# Patient Record
Sex: FEMALE | Race: BLACK OR AFRICAN AMERICAN
[De-identification: names, ages, dates, MRNs, and addresses within clinical notes are randomized per-mention and may not be internally consistent; named-entity substitution may affect disease eponyms.]

---

## 2019-12-23 ENCOUNTER — HOSPITAL ENCOUNTER (EMERGENCY)
Dept: HOSPITAL 72 - EMR | Age: 69
Discharge: HOME | End: 2019-12-23
Payer: MEDICARE

## 2019-12-23 VITALS — BODY MASS INDEX: 29.02 KG/M2 | WEIGHT: 170 LBS | HEIGHT: 64 IN

## 2019-12-23 VITALS — SYSTOLIC BLOOD PRESSURE: 139 MMHG | DIASTOLIC BLOOD PRESSURE: 84 MMHG

## 2019-12-23 DIAGNOSIS — F31.9: ICD-10-CM

## 2019-12-23 DIAGNOSIS — E78.5: ICD-10-CM

## 2019-12-23 DIAGNOSIS — I10: ICD-10-CM

## 2019-12-23 DIAGNOSIS — E11.65: Primary | ICD-10-CM

## 2019-12-23 PROCEDURE — 82962 GLUCOSE BLOOD TEST: CPT

## 2019-12-23 PROCEDURE — 99283 EMERGENCY DEPT VISIT LOW MDM: CPT

## 2019-12-23 PROCEDURE — 96372 THER/PROPH/DIAG INJ SC/IM: CPT

## 2019-12-23 NOTE — NUR
ER DISCHARGE NOTE: 10 units Reg insulin administered and patient teaching given 
regarding hyperglycemia. Patient is cleared to be discharged per ERMD, pt is 
aox4, on room air, with stable vital signs. pt was given dc and prescription 
instructions, pt was able to verbalize understanding, pt id band removed. pt is 
able to ambulate with steady gait. pt took all belongings.

## 2019-12-23 NOTE — NUR
ED Nurse Note:

pt arrives with  who states that pt has not been compliant with her 
medications for dm.  pt is alert and oriented at this time.  md parr of pt.

## 2019-12-23 NOTE — EMERGENCY ROOM REPORT
History of Present Illness


General


Chief Complaint:  General Complaint


Source:  Patient, Significant Other





Present Illness


HPI


69-year-old female presents ED for evaluation.  Brought in by  states 

that her Accu-Chek was high.  Over 300 at home.  History of diabetes.  States 

that she sometimes forgets to take her medications.  States she feels fine.  

Denies any dizziness or blurry vision.  Denies nausea or vomiting.  Also states 

she has been eating a lot of sweets lately because of the holidays.  No other 

aggravating relieving factors.  Denies any other associated symptoms


Allergies:  


Coded Allergies:  


     No Known Allergies (Unverified , 12/7/12)





Patient History


Past Medical History:  DM, HTN, psych hx


Pregnant Now:  No





Nursing Documentation-PMH


Hx Cardiac Problems:  Yes - Hyperlipidemia


Hx Hypertension:  Yes


Hx Asthma:  Yes


Hx Diabetes:  Yes


Hx Neurological Problems:  Yes - Bipolar





Review of Systems


All Other Systems:  negative except mentioned in HPI





Physical Exam





Vital Signs








  Date Time  Temp Pulse Resp B/P (MAP) Pulse Ox O2 Delivery O2 Flow Rate FiO2


 


12/23/19 12:42 98.1 111 20 139/84 (102) 96 Room Air  








Sp02 EP Interpretation:  reviewed, normal


General Appearance:  no apparent distress, alert, GCS 15, non-toxic


Head:  normocephalic, atraumatic


Eyes:  bilateral eye normal inspection, bilateral eye PERRL


ENT:  hearing grossly normal, normal pharynx, no angioedema, normal voice


Neck:  full range of motion, supple/symm/no masses


Respiratory:  chest non-tender, lungs clear, normal breath sounds, speaking 

full sentences


Cardiovascular #1:  regular rate, rhythm, no edema


Cardiovascular #2:  2+ carotid (R), 2+ carotid (L), 2+ radial (R), 2+ radial (L)

, 2+ dorsalis pedis (R), 2+ dorsalis pedis (L)


Gastrointestinal:  normal bowel sounds, non tender, soft, non-distended, no 

guarding, no rebound


Rectal:  deferred


Genitourinary:  normal inspection, no CVA tenderness


Musculoskeletal:  back normal, normal range of motion, gait/station normal, non-

tender


Neurologic:  alert, motor strength/tone normal, oriented x3, sensory intact, 

responsive, speech normal


Psychiatric:  judgement/insight normal, memory normal, mood/affect normal, no 

suicidal/homicidal ideation


Reflexes:  3+ bicep (R), 3+ bicep (L), 3+ tricep (R), 3+ tricep (L), 3+ knee (R)

, 3+ knee (L)


Lymphatic:  no adenopathy





Medical Decision Making


Diagnostic Impression:  


 Primary Impression:  


 Hyperglycemia


ER Course


Hospital Course 


69-year-old female presenting to ED with elevated BS.  not compliant with her 

meds 





Differential diagnoses include: ETOH/drug ingestion, sepsis, DKA





Clinical course


Patient placed on stretcher.  On cardiac monitor.  After initial history, exam 

reveals middle-aged female in no acute distress.  Physical exam unremarkable.





Vitals stable.  Patient appears well, nontoxic appearing.  No distress.  My 

suspicion for acute process such as DKA is low.  Asymptomatic hyperglycemia.





Given insulin in ED.  Discussed findings with patient and .  Will 

discharge to home with refill of her glimepiride.  Encourage compliance with 

her medications.  Safe for discharge for close outpatient follow-up.  States 

she has a PMD





i.  I feel this is a highly complex case requiring extensive working including 

EKG/Rhythm strip, Xray/CT/US, Blood/urine lab work, repeat exams while in ED, 

and administration of strong opiates/narcotics for pain control, admission to 

hospital or close patient follow up.  





diagnosis - hyperglycemia 





Stable and discharged to home with prescription for glimeperide.  Followup with 

PMD.  Return to ED if symptoms recur or worsen





Last Vital Signs








  Date Time  Temp Pulse Resp B/P (MAP) Pulse Ox O2 Delivery O2 Flow Rate FiO2


 


12/23/19 13:53 98.0 83 20  97 Room Air  








Status:  improved


Disposition:  HOME, SELF-CARE


Condition:  Stable


Scripts


Glimepiride* (GLIMEPIRIDE*) 4 Mg Tablet


4 MG ORAL BEFORE BREAKFAST, #30 TAB


   Prov: Corey Clay MD         12/23/19


Referrals:  


NON PHYSICIAN (PCP)


Patient Instructions:  Hyperglycemia, Easy-to-Read











Corey Clay MD Dec 23, 2019 14:34